# Patient Record
Sex: MALE | Race: AMERICAN INDIAN OR ALASKA NATIVE | NOT HISPANIC OR LATINO | URBAN - METROPOLITAN AREA
[De-identification: names, ages, dates, MRNs, and addresses within clinical notes are randomized per-mention and may not be internally consistent; named-entity substitution may affect disease eponyms.]

---

## 2020-12-31 ENCOUNTER — EMERGENCY (EMERGENCY)
Facility: HOSPITAL | Age: 45
LOS: 0 days | Discharge: HOME | End: 2021-01-01
Attending: EMERGENCY MEDICINE | Admitting: EMERGENCY MEDICINE
Payer: COMMERCIAL

## 2020-12-31 VITALS
DIASTOLIC BLOOD PRESSURE: 93 MMHG | TEMPERATURE: 102 F | OXYGEN SATURATION: 97 % | HEART RATE: 100 BPM | RESPIRATION RATE: 18 BRPM | SYSTOLIC BLOOD PRESSURE: 158 MMHG

## 2020-12-31 VITALS
TEMPERATURE: 100 F | SYSTOLIC BLOOD PRESSURE: 141 MMHG | OXYGEN SATURATION: 98 % | DIASTOLIC BLOOD PRESSURE: 74 MMHG | HEART RATE: 92 BPM | RESPIRATION RATE: 18 BRPM

## 2020-12-31 DIAGNOSIS — E78.5 HYPERLIPIDEMIA, UNSPECIFIED: ICD-10-CM

## 2020-12-31 DIAGNOSIS — R50.9 FEVER, UNSPECIFIED: ICD-10-CM

## 2020-12-31 DIAGNOSIS — E11.9 TYPE 2 DIABETES MELLITUS WITHOUT COMPLICATIONS: ICD-10-CM

## 2020-12-31 DIAGNOSIS — Z20.828 CONTACT WITH AND (SUSPECTED) EXPOSURE TO OTHER VIRAL COMMUNICABLE DISEASES: ICD-10-CM

## 2020-12-31 DIAGNOSIS — U07.1 COVID-19: ICD-10-CM

## 2020-12-31 DIAGNOSIS — E03.9 HYPOTHYROIDISM, UNSPECIFIED: ICD-10-CM

## 2020-12-31 DIAGNOSIS — Z91.048 OTHER NONMEDICINAL SUBSTANCE ALLERGY STATUS: ICD-10-CM

## 2020-12-31 LAB
BASOPHILS # BLD AUTO: 0 K/UL — SIGNIFICANT CHANGE UP (ref 0–0.2)
BASOPHILS NFR BLD AUTO: 0 % — SIGNIFICANT CHANGE UP (ref 0–1)
EOSINOPHIL # BLD AUTO: 0.05 K/UL — SIGNIFICANT CHANGE UP (ref 0–0.7)
EOSINOPHIL NFR BLD AUTO: 0.9 % — SIGNIFICANT CHANGE UP (ref 0–8)
HCT VFR BLD CALC: 41.8 % — LOW (ref 42–52)
HGB BLD-MCNC: 14.2 G/DL — SIGNIFICANT CHANGE UP (ref 14–18)
IMM GRANULOCYTES NFR BLD AUTO: 0.2 % — SIGNIFICANT CHANGE UP (ref 0.1–0.3)
LYMPHOCYTES # BLD AUTO: 0.86 K/UL — LOW (ref 1.2–3.4)
LYMPHOCYTES # BLD AUTO: 15.9 % — LOW (ref 20.5–51.1)
MCHC RBC-ENTMCNC: 29.6 PG — SIGNIFICANT CHANGE UP (ref 27–31)
MCHC RBC-ENTMCNC: 34 G/DL — SIGNIFICANT CHANGE UP (ref 32–37)
MCV RBC AUTO: 87.1 FL — SIGNIFICANT CHANGE UP (ref 80–94)
MONOCYTES # BLD AUTO: 0.7 K/UL — HIGH (ref 0.1–0.6)
MONOCYTES NFR BLD AUTO: 12.9 % — HIGH (ref 1.7–9.3)
NEUTROPHILS # BLD AUTO: 3.79 K/UL — SIGNIFICANT CHANGE UP (ref 1.4–6.5)
NEUTROPHILS NFR BLD AUTO: 70.1 % — SIGNIFICANT CHANGE UP (ref 42.2–75.2)
NRBC # BLD: 0 /100 WBCS — SIGNIFICANT CHANGE UP (ref 0–0)
PLATELET # BLD AUTO: 204 K/UL — SIGNIFICANT CHANGE UP (ref 130–400)
RBC # BLD: 4.8 M/UL — SIGNIFICANT CHANGE UP (ref 4.7–6.1)
RBC # FLD: 12.8 % — SIGNIFICANT CHANGE UP (ref 11.5–14.5)
WBC # BLD: 5.41 K/UL — SIGNIFICANT CHANGE UP (ref 4.8–10.8)
WBC # FLD AUTO: 5.41 K/UL — SIGNIFICANT CHANGE UP (ref 4.8–10.8)

## 2020-12-31 PROCEDURE — 99284 EMERGENCY DEPT VISIT MOD MDM: CPT | Mod: CR

## 2020-12-31 RX ORDER — ROSUVASTATIN CALCIUM 5 MG/1
1 TABLET ORAL
Qty: 0 | Refills: 0 | DISCHARGE

## 2020-12-31 RX ORDER — LEVOTHYROXINE SODIUM 125 MCG
1 TABLET ORAL
Qty: 0 | Refills: 0 | DISCHARGE

## 2020-12-31 RX ORDER — KETOROLAC TROMETHAMINE 30 MG/ML
15 SYRINGE (ML) INJECTION ONCE
Refills: 0 | Status: DISCONTINUED | OUTPATIENT
Start: 2020-12-31 | End: 2020-12-31

## 2020-12-31 RX ORDER — SODIUM CHLORIDE 9 MG/ML
2000 INJECTION INTRAMUSCULAR; INTRAVENOUS; SUBCUTANEOUS ONCE
Refills: 0 | Status: COMPLETED | OUTPATIENT
Start: 2020-12-31 | End: 2020-12-31

## 2020-12-31 RX ORDER — ACETAMINOPHEN 500 MG
975 TABLET ORAL ONCE
Refills: 0 | Status: COMPLETED | OUTPATIENT
Start: 2020-12-31 | End: 2020-12-31

## 2020-12-31 RX ORDER — METFORMIN HYDROCHLORIDE 850 MG/1
0 TABLET ORAL
Qty: 0 | Refills: 0 | DISCHARGE

## 2020-12-31 RX ADMIN — Medication 15 MILLIGRAM(S): at 23:59

## 2020-12-31 RX ADMIN — SODIUM CHLORIDE 2000 MILLILITER(S): 9 INJECTION INTRAMUSCULAR; INTRAVENOUS; SUBCUTANEOUS at 23:59

## 2020-12-31 RX ADMIN — Medication 975 MILLIGRAM(S): at 23:59

## 2021-01-01 LAB
ALBUMIN SERPL ELPH-MCNC: 4.1 G/DL — SIGNIFICANT CHANGE UP (ref 3.5–5.2)
ALP SERPL-CCNC: 67 U/L — SIGNIFICANT CHANGE UP (ref 30–115)
ALT FLD-CCNC: 29 U/L — SIGNIFICANT CHANGE UP (ref 0–41)
ANION GAP SERPL CALC-SCNC: 11 MMOL/L — SIGNIFICANT CHANGE UP (ref 7–14)
AST SERPL-CCNC: 32 U/L — SIGNIFICANT CHANGE UP (ref 0–41)
BILIRUB SERPL-MCNC: 0.4 MG/DL — SIGNIFICANT CHANGE UP (ref 0.2–1.2)
BUN SERPL-MCNC: 14 MG/DL — SIGNIFICANT CHANGE UP (ref 10–20)
CALCIUM SERPL-MCNC: 8.9 MG/DL — SIGNIFICANT CHANGE UP (ref 8.5–10.1)
CHLORIDE SERPL-SCNC: 100 MMOL/L — SIGNIFICANT CHANGE UP (ref 98–110)
CO2 SERPL-SCNC: 23 MMOL/L — SIGNIFICANT CHANGE UP (ref 17–32)
CREAT SERPL-MCNC: 1 MG/DL — SIGNIFICANT CHANGE UP (ref 0.7–1.5)
GLUCOSE SERPL-MCNC: 138 MG/DL — HIGH (ref 70–99)
MAGNESIUM SERPL-MCNC: 2.1 MG/DL — SIGNIFICANT CHANGE UP (ref 1.8–2.4)
POTASSIUM SERPL-MCNC: 4.2 MMOL/L — SIGNIFICANT CHANGE UP (ref 3.5–5)
POTASSIUM SERPL-SCNC: 4.2 MMOL/L — SIGNIFICANT CHANGE UP (ref 3.5–5)
PROT SERPL-MCNC: 7.2 G/DL — SIGNIFICANT CHANGE UP (ref 6–8)
SODIUM SERPL-SCNC: 134 MMOL/L — LOW (ref 135–146)
TROPONIN T SERPL-MCNC: <0.01 NG/ML — SIGNIFICANT CHANGE UP

## 2021-01-01 PROCEDURE — 71045 X-RAY EXAM CHEST 1 VIEW: CPT | Mod: 26

## 2021-01-01 NOTE — ED PROVIDER NOTE - ATTENDING CONTRIBUTION TO CARE
45yM DM on metformin HTN DLD p/w flu-like sx - chills, myalgias, generalized weakness and body aches x days.  Pt w/ known covid+ contact.  Pt w/ mild SOB but no sig CP.  Pt tolerating PO.    VS w/o hypoxia  exam w/ nontoxic appearing male w/o any resp distress/inc WOB

## 2021-01-01 NOTE — ED PROVIDER NOTE - PHYSICAL EXAMINATION
PHYSICAL EXAM:    GENERAL: Alert, appears stated age, well appearing, non-toxic  SKIN: Warm, pink and dry.   HEAD: NC  EYE: Normal lids/conjunctiva  ENT: Normal hearing, patent oropharynx without erythema or exudate  NECK: +supple. No meningismus, or JVD  Pulm: Bilateral BS, normal resp effort, no wheezes, stridor, or retractions. speaking in compelte sentences @ 98 sat on ra.   CV: RRR, no M/R/G, 2+and = radial pulses  Abd: soft, non-tender, non-distended. no rebound/guarding.    Mskel: no erythema, cyanosis, edema. no calf tenderness  Neuro: AAOx3, . normal gait.

## 2021-01-01 NOTE — ED PROVIDER NOTE - CLINICAL SUMMARY MEDICAL DECISION MAKING FREE TEXT BOX
45yM DM HTN DLD p/w flu-like sx in the setting of covid+ contact.  Pt well appearing, comfortable, w/o hypoxia or resp distress.  Labs reassuring.  EKG w/o ischemia or arrhythmia.  CXR w/ b/l opacities c/w covid infection.  COVID PCR pending.  Recommend supportive care, o/p f/u, return precautions.

## 2021-01-01 NOTE — ED PROVIDER NOTE - OBJECTIVE STATEMENT
44 y/o M with PMH DM on metformin, HLD, hypothyroidism presents with global weakness, sweats/chills/body aches, mild constant aching diffuse body pain x 2 days in the setting of being around his girlfriend x 2 wks and discovering she is covid+ 3 days ago.   no cp.   Denies hemoptysis, recent surgery/immobilization, hx cancers, hx PE/DVT,  hormone use, leg pain or swelling, recent travel.

## 2021-01-01 NOTE — ED PROVIDER NOTE - NS ED ROS FT
Review of Systems    Constitutional: (+) fever   Eyes/ENT: (-) vision changes  Cardiovascular: (-) chest pain, (-) syncope (-) palpitations  Respiratory: (-) cough, (+) shortness of breath  Gastrointestinal: (-) vomiting, (-) diarrhea (-) abdominal pain  Genitourinary:  (-) dysuria   Musculoskeletal: (-) neck pain, (-) back pain, (-) leg pain/swelling  Integumentary: (-) rash, (-) edema  Neurological: (-) headache, (-) confusion  Hematologic: (-) easy bruising   Psychiatric: (-) hallucinations  Allergic/Immunologic: (-) pruritus

## 2021-01-02 LAB — SARS-COV-2 RNA SPEC QL NAA+PROBE: DETECTED

## 2021-01-07 ENCOUNTER — INPATIENT (INPATIENT)
Facility: HOSPITAL | Age: 46
LOS: 1 days | Discharge: HOME | End: 2021-01-09
Attending: INTERNAL MEDICINE | Admitting: INTERNAL MEDICINE
Payer: COMMERCIAL

## 2021-01-07 VITALS
HEART RATE: 96 BPM | TEMPERATURE: 99 F | DIASTOLIC BLOOD PRESSURE: 67 MMHG | HEIGHT: 66 IN | SYSTOLIC BLOOD PRESSURE: 121 MMHG | OXYGEN SATURATION: 95 % | RESPIRATION RATE: 20 BRPM | WEIGHT: 175.05 LBS

## 2021-01-07 LAB
ALBUMIN SERPL ELPH-MCNC: 3.7 G/DL — SIGNIFICANT CHANGE UP (ref 3.5–5.2)
ALP SERPL-CCNC: 49 U/L — SIGNIFICANT CHANGE UP (ref 30–115)
ALT FLD-CCNC: 28 U/L — SIGNIFICANT CHANGE UP (ref 0–41)
ANION GAP SERPL CALC-SCNC: 11 MMOL/L — SIGNIFICANT CHANGE UP (ref 7–14)
AST SERPL-CCNC: 33 U/L — SIGNIFICANT CHANGE UP (ref 0–41)
BASOPHILS # BLD AUTO: 0.01 K/UL — SIGNIFICANT CHANGE UP (ref 0–0.2)
BASOPHILS NFR BLD AUTO: 0.2 % — SIGNIFICANT CHANGE UP (ref 0–1)
BILIRUB SERPL-MCNC: 0.5 MG/DL — SIGNIFICANT CHANGE UP (ref 0.2–1.2)
BUN SERPL-MCNC: 12 MG/DL — SIGNIFICANT CHANGE UP (ref 10–20)
CALCIUM SERPL-MCNC: 8.7 MG/DL — SIGNIFICANT CHANGE UP (ref 8.5–10.1)
CHLORIDE SERPL-SCNC: 96 MMOL/L — LOW (ref 98–110)
CO2 SERPL-SCNC: 24 MMOL/L — SIGNIFICANT CHANGE UP (ref 17–32)
CREAT SERPL-MCNC: 0.9 MG/DL — SIGNIFICANT CHANGE UP (ref 0.7–1.5)
CRP SERPL-MCNC: 7.48 MG/DL — HIGH (ref 0–0.4)
D DIMER BLD IA.RAPID-MCNC: 122 NG/ML DDU — SIGNIFICANT CHANGE UP (ref 0–230)
EOSINOPHIL # BLD AUTO: 0 K/UL — SIGNIFICANT CHANGE UP (ref 0–0.7)
EOSINOPHIL NFR BLD AUTO: 0 % — SIGNIFICANT CHANGE UP (ref 0–8)
FERRITIN SERPL-MCNC: 641 NG/ML — HIGH (ref 30–400)
GLUCOSE SERPL-MCNC: 118 MG/DL — HIGH (ref 70–99)
HCT VFR BLD CALC: 36.8 % — LOW (ref 42–52)
HGB BLD-MCNC: 12.6 G/DL — LOW (ref 14–18)
IMM GRANULOCYTES NFR BLD AUTO: 0.4 % — HIGH (ref 0.1–0.3)
LYMPHOCYTES # BLD AUTO: 0.7 K/UL — LOW (ref 1.2–3.4)
LYMPHOCYTES # BLD AUTO: 15 % — LOW (ref 20.5–51.1)
MCHC RBC-ENTMCNC: 29.4 PG — SIGNIFICANT CHANGE UP (ref 27–31)
MCHC RBC-ENTMCNC: 34.2 G/DL — SIGNIFICANT CHANGE UP (ref 32–37)
MCV RBC AUTO: 85.8 FL — SIGNIFICANT CHANGE UP (ref 80–94)
MONOCYTES # BLD AUTO: 0.48 K/UL — SIGNIFICANT CHANGE UP (ref 0.1–0.6)
MONOCYTES NFR BLD AUTO: 10.3 % — HIGH (ref 1.7–9.3)
NEUTROPHILS # BLD AUTO: 3.47 K/UL — SIGNIFICANT CHANGE UP (ref 1.4–6.5)
NEUTROPHILS NFR BLD AUTO: 74.1 % — SIGNIFICANT CHANGE UP (ref 42.2–75.2)
NRBC # BLD: 0 /100 WBCS — SIGNIFICANT CHANGE UP (ref 0–0)
PLATELET # BLD AUTO: 212 K/UL — SIGNIFICANT CHANGE UP (ref 130–400)
POTASSIUM SERPL-MCNC: 4.2 MMOL/L — SIGNIFICANT CHANGE UP (ref 3.5–5)
POTASSIUM SERPL-SCNC: 4.2 MMOL/L — SIGNIFICANT CHANGE UP (ref 3.5–5)
PROCALCITONIN SERPL-MCNC: 0.08 NG/ML — SIGNIFICANT CHANGE UP (ref 0.02–0.1)
PROT SERPL-MCNC: 6.8 G/DL — SIGNIFICANT CHANGE UP (ref 6–8)
RBC # BLD: 4.29 M/UL — LOW (ref 4.7–6.1)
RBC # FLD: 12.6 % — SIGNIFICANT CHANGE UP (ref 11.5–14.5)
SODIUM SERPL-SCNC: 131 MMOL/L — LOW (ref 135–146)
WBC # BLD: 4.68 K/UL — LOW (ref 4.8–10.8)
WBC # FLD AUTO: 4.68 K/UL — LOW (ref 4.8–10.8)

## 2021-01-07 PROCEDURE — 99223 1ST HOSP IP/OBS HIGH 75: CPT | Mod: AI

## 2021-01-07 PROCEDURE — 71045 X-RAY EXAM CHEST 1 VIEW: CPT | Mod: 26

## 2021-01-07 PROCEDURE — 99285 EMERGENCY DEPT VISIT HI MDM: CPT

## 2021-01-07 RX ORDER — PANTOPRAZOLE SODIUM 20 MG/1
40 TABLET, DELAYED RELEASE ORAL
Refills: 0 | Status: DISCONTINUED | OUTPATIENT
Start: 2021-01-07 | End: 2021-01-09

## 2021-01-07 RX ORDER — ACETAMINOPHEN 500 MG
650 TABLET ORAL EVERY 6 HOURS
Refills: 0 | Status: DISCONTINUED | OUTPATIENT
Start: 2021-01-07 | End: 2021-01-09

## 2021-01-07 RX ORDER — INFLUENZA VIRUS VACCINE 15; 15; 15; 15 UG/.5ML; UG/.5ML; UG/.5ML; UG/.5ML
0.5 SUSPENSION INTRAMUSCULAR ONCE
Refills: 0 | Status: DISCONTINUED | OUTPATIENT
Start: 2021-01-07 | End: 2021-01-09

## 2021-01-07 RX ORDER — ATORVASTATIN CALCIUM 80 MG/1
20 TABLET, FILM COATED ORAL AT BEDTIME
Refills: 0 | Status: DISCONTINUED | OUTPATIENT
Start: 2021-01-07 | End: 2021-01-09

## 2021-01-07 RX ORDER — METFORMIN HYDROCHLORIDE 850 MG/1
500 TABLET ORAL
Refills: 0 | Status: DISCONTINUED | OUTPATIENT
Start: 2021-01-07 | End: 2021-01-08

## 2021-01-07 RX ORDER — DEXAMETHASONE 0.5 MG/5ML
6 ELIXIR ORAL ONCE
Refills: 0 | Status: COMPLETED | OUTPATIENT
Start: 2021-01-07 | End: 2021-01-07

## 2021-01-07 RX ORDER — ENOXAPARIN SODIUM 100 MG/ML
40 INJECTION SUBCUTANEOUS DAILY
Refills: 0 | Status: DISCONTINUED | OUTPATIENT
Start: 2021-01-07 | End: 2021-01-09

## 2021-01-07 RX ORDER — DEXAMETHASONE 0.5 MG/5ML
6 ELIXIR ORAL DAILY
Refills: 0 | Status: DISCONTINUED | OUTPATIENT
Start: 2021-01-08 | End: 2021-01-09

## 2021-01-07 RX ORDER — CHLORHEXIDINE GLUCONATE 213 G/1000ML
1 SOLUTION TOPICAL
Refills: 0 | Status: DISCONTINUED | OUTPATIENT
Start: 2021-01-07 | End: 2021-01-09

## 2021-01-07 RX ORDER — SODIUM CHLORIDE 9 MG/ML
1000 INJECTION INTRAMUSCULAR; INTRAVENOUS; SUBCUTANEOUS ONCE
Refills: 0 | Status: COMPLETED | OUTPATIENT
Start: 2021-01-07 | End: 2021-01-07

## 2021-01-07 RX ORDER — LEVOTHYROXINE SODIUM 125 MCG
50 TABLET ORAL DAILY
Refills: 0 | Status: DISCONTINUED | OUTPATIENT
Start: 2021-01-07 | End: 2021-01-09

## 2021-01-07 RX ORDER — SODIUM CHLORIDE 9 MG/ML
1000 INJECTION INTRAMUSCULAR; INTRAVENOUS; SUBCUTANEOUS
Refills: 0 | Status: DISCONTINUED | OUTPATIENT
Start: 2021-01-07 | End: 2021-01-08

## 2021-01-07 RX ADMIN — ATORVASTATIN CALCIUM 20 MILLIGRAM(S): 80 TABLET, FILM COATED ORAL at 21:52

## 2021-01-07 RX ADMIN — CHLORHEXIDINE GLUCONATE 1 APPLICATION(S): 213 SOLUTION TOPICAL at 15:10

## 2021-01-07 RX ADMIN — ENOXAPARIN SODIUM 40 MILLIGRAM(S): 100 INJECTION SUBCUTANEOUS at 15:11

## 2021-01-07 RX ADMIN — METFORMIN HYDROCHLORIDE 500 MILLIGRAM(S): 850 TABLET ORAL at 18:31

## 2021-01-07 RX ADMIN — SODIUM CHLORIDE 75 MILLILITER(S): 9 INJECTION INTRAMUSCULAR; INTRAVENOUS; SUBCUTANEOUS at 17:01

## 2021-01-07 RX ADMIN — Medication 6 MILLIGRAM(S): at 06:55

## 2021-01-07 RX ADMIN — Medication 50 MICROGRAM(S): at 10:01

## 2021-01-07 RX ADMIN — SODIUM CHLORIDE 75 MILLILITER(S): 9 INJECTION INTRAMUSCULAR; INTRAVENOUS; SUBCUTANEOUS at 09:35

## 2021-01-07 RX ADMIN — SODIUM CHLORIDE 333.33 MILLILITER(S): 9 INJECTION INTRAMUSCULAR; INTRAVENOUS; SUBCUTANEOUS at 07:04

## 2021-01-07 RX ADMIN — PANTOPRAZOLE SODIUM 40 MILLIGRAM(S): 20 TABLET, DELAYED RELEASE ORAL at 10:01

## 2021-01-07 NOTE — ED PROVIDER NOTE - ATTENDING CONTRIBUTION TO CARE
46 yo Cantonese speaking M, hx of DM II on metformin, HLD, hypothyroidism, diagnosed with Covid 1/1 with sx onset 12/30, here for worsening dyspnea.    Patient with RA resting sat of 95 but rapid desat to high 80s with minimal exertion, mildly tachypenic, coarse BS bilaterally.    Patient has DM II as risk factor, is nearing peak of sx with exertional desat requiring supplemental O2. Also has worsening of CXR when compared to 1/1.    Will check labs, give dex, admit north for further monitoring, supplemental O2.

## 2021-01-07 NOTE — H&P ADULT - NSHPLABSRESULTS_GEN_ALL_CORE
01-07    131<L>  |  96<L>  |  12  ----------------------------<  118<H>  4.2   |  24  |  0.9    Ca    8.7      07 Jan 2021 07:00    TPro  6.8  /  Alb  3.7  /  TBili  0.5  /  DBili  x   /  AST  33  /  ALT  28  /  AlkPhos  49  01-07                            12.6   4.68  )-----------( 212      ( 07 Jan 2021 07:00 )             36.8          cxr: worsening opacities compared to previous cxr

## 2021-01-07 NOTE — ED PROVIDER NOTE - OBJECTIVE STATEMENT
44 yo male hx of HLD/DM/hypothyroid present c/o SOB/generalized weakness over the past week. Reported he was checking his Pulse Ox at home and it was down to 90-91% at home so his brother called EMS. Patient was tested positive for COVID few days ago. Also reported subjective fever/chill. denies HA/chest pain/abd pain/n/v/d and urinary sxs.

## 2021-01-07 NOTE — ED PROVIDER NOTE - CLINICAL SUMMARY MEDICAL DECISION MAKING FREE TEXT BOX
patient admitted for further management at this time secondary to covid infection with hypoxia room air o2 into the upper 80's I will admit for further management

## 2021-01-07 NOTE — ED PROVIDER NOTE - PHYSICAL EXAMINATION
CONSTITUTIONAL: Well-appearing; well-nourished; in no apparent distress.   EYES: PERRL; EOM intact.   CARDIOVASCULAR: Normal S1, S2; no murmurs, rubs, or gallops.   RESPIRATORY: RR - 20.    GI/: Normal bowel sounds; non-distended; non-tender; no palpable organomegaly.   MS: No calf swelling and tenderness.  SKIN: Normal for age and race; warm; dry; good turgor; no apparent lesions or exudate.   NEURO/PSYCH: A & O x 4; grossly unremarkable. CONSTITUTIONAL: Well-appearing; well-nourished; in no apparent distress.   EYES: PERRL; EOM intact.   CARDIOVASCULAR: Normal S1, S2; no murmurs, rubs, or gallops.   RESPIRATORY: RR - 20. b/l diffuse rales. mild accessory muscles use.   GI/: Normal bowel sounds; non-distended; non-tender; no palpable organomegaly.   MS: No calf swelling and tenderness.  SKIN: Normal for age and race; warm; dry; good turgor; no apparent lesions or exudate.   NEURO/PSYCH: A & O x 4; grossly unremarkable.

## 2021-01-07 NOTE — H&P ADULT - HISTORY OF PRESENT ILLNESS
44 yo male hx of HLD/DM/hypothyroid present c/o SOB/generalized weakness over the past week. Reported he was checking his Pulse Ox at home and it was down to 90-91% at home so his brother called EMS. Patient was tested positive for COVID few days ago. Also reported subjective fever/chill. Denies HA/chest pain/abd pain/n/v/d and urinary sxs. 46 yo male hx of HLD/DM/hypothyroid present c/o SOB/generalized weakness over the past week. Reported he was checking his Pulse Ox at home and it was down to 90-91% at home so his brother called EMS. Patient was tested positive for COVID few days ago. Also reported subjective fever/chill. Denies HA/chest pain/abd pain/n/v/d and urinary sxs.    feels better with O2 on but feel worse when he has to ambulate

## 2021-01-07 NOTE — H&P ADULT - ASSESSMENT
46 yo Cantonese speaking M, hx of DM II on metformin, HLD, hypothyroidism, diagnosed with Covid 1/1 with sx onset 12/30, here for worsening dyspnea.    Patient with RA resting sat of 95 but rapid desat to high 80s with minimal exertion, mildly tachypneic, coarse BS bilaterally.    Patient has DM II as risk factor, is nearing peak of sx with exertional desat requiring supplemental O2. Also has worsening of CXR when compared to 1/1.    # COVID PNA   - F/U INFLAM. drawn today   - d-dimer 122  - leukopenic : labs in am   - cxr in am   - started DEC in er , will c/w 6mg daily   - ID consult   - isolation   - oxygen supplementation for sat over 92%    # Hyponatremia 131  - ivf ns at 75 cc / hr   - labs in am    44 yo Cantonese speaking M, hx of DM II on metformin, HLD, hypothyroidism, diagnosed with Covid 1/1 with sx onset 12/30, here for worsening dyspnea.    Patient with RA resting sat of 95 but rapid desat to high 80s with minimal exertion, mildly tachypneic, coarse BS bilaterally.    Patient has DM II as risk factor, is nearing peak of sx with exertional desat requiring supplemental O2. Also has worsening of CXR when compared to 1/1.    # COVID PNA   - F/U INFLAM. drawn today   - d-dimer 122  - leukopenic : labs in am   - cxr in am   - started DEC in er , will c/w 6mg daily   - ID consult   - isolation   - oxygen supplementation for sat over 92%  - lovenox 40mg daily     # Hyponatremia 131  - ivf ns at 75 cc / hr   - labs in am     # DM  - c/w metformin   - fsbsl     # HLD   - c/w statin     # Hypothyroidism   - c/w synthroid     d/w dr. Pyle   46 yo Cantonese speaking M, hx of DM II on metformin, HLD, hypothyroidism, diagnosed with Covid 1/1 with sx onset 12/30, here for worsening dyspnea.    Patient with RA resting sat of 95 but rapid desat to high 80s with minimal exertion, mildly tachypneic, coarse BS bilaterally.    Patient has DM II as risk factor, is nearing peak of sx with exertional desat requiring supplemental O2. Also has worsening of CXR when compared to 1/1.    # COVID PNA   - F/U INFLAM. drawn today   - d-dimer 122  - leukopenic : labs in am   - cxr in am   - Xtarted DEC in er , will c/w 6mg daily   - ID consult   - isolation   - oxygen supplementation for sat over 92%  - lovenox 40mg daily     # Hyponatremia 131  - ivf ns at 75 cc / hr   - labs in am     # DM  - c/w metformin   - fsbsl     # HLD   - c/w statin     # Hypothyroidism   - c/w synthroid     d/w dr. Pyle

## 2021-01-07 NOTE — H&P ADULT - ATTENDING COMMENTS
I have performed a history and physical exam of this patient and discussed the management with advanced clinical provider. I have reviewed the note and agree with the documented findings and plan of care.

## 2021-01-07 NOTE — ED PROVIDER NOTE - PROGRESS NOTE DETAILS
Ambulatory Saturation down to 90% on RA. Increasing LLL infiltrate. will admit AG: received signout from DEMOND Araujo. Pt seen at bedside resting comfortably texting on phone, no new complaints. Pending labs, will admit north for worsening covid + hypoxia on ambulation.

## 2021-01-07 NOTE — ED PROVIDER NOTE - NS ED ROS FT
Constitutional: + Malaise. no fever, chills, no recent weight loss, change in appetite  Eyes: no redness/discharge/pain/vision changes  ENT: no rhinorrhea/ear pain/sore throat  Cardiac: No chest pain or edema.  Respiratory: see HPI  GI: No nausea, vomiting, diarrhea or abdominal pain.  : No dysuria, frequency, urgency or hematuria  MS: no pain to back or extremities, no loss of ROM, no weakness  Neuro: No headache or weakness. No LOC.  Skin: No skin rash.  Endocrine: + diabetes.

## 2021-01-07 NOTE — H&P ADULT - NSHPPHYSICALEXAM_GEN_ALL_CORE
Vital Signs Last 24 Hrs  T(C): 37.8 (07 Jan 2021 07:37), Max: 37.8 (07 Jan 2021 07:37)  T(F): 100.1 (07 Jan 2021 07:37), Max: 100.1 (07 Jan 2021 07:37)  HR: 90 (07 Jan 2021 07:37) (90 - 96)  BP: 128/64 (07 Jan 2021 07:37) (121/67 - 128/64)  RR: 20 (07 Jan 2021 07:37) (20 - 20)  SpO2: 98% (07 Jan 2021 07:37) (95% - 98%)    CONSTITUTIONAL: Well-appearing; well-nourished; in no apparent distress.   EYES: PERRL; EOM intact.   CARDIOVASCULAR: Normal S1, S2; no murmurs, rubs, or gallops.   RESPIRATORY: RR -  18- 20. b/l diffuse rales. mild accessory muscles use.   GI/: Normal bowel sounds; non-distended; non-tender; no palpable organomegaly.   MS: No calf swelling and tenderness.  SKIN: Normal for age and race; warm; dry; good turgor; no apparent lesions or exudate.   NEURO/PSYCH: A & O x 4; grossly unremarkable.

## 2021-01-08 ENCOUNTER — TRANSCRIPTION ENCOUNTER (OUTPATIENT)
Age: 46
End: 2021-01-08

## 2021-01-08 LAB
A1C WITH ESTIMATED AVERAGE GLUCOSE RESULT: 6.6 % — HIGH (ref 4–5.6)
ANION GAP SERPL CALC-SCNC: 11 MMOL/L — SIGNIFICANT CHANGE UP (ref 7–14)
BASOPHILS # BLD AUTO: 0 K/UL — SIGNIFICANT CHANGE UP (ref 0–0.2)
BASOPHILS NFR BLD AUTO: 0 % — SIGNIFICANT CHANGE UP (ref 0–1)
BUN SERPL-MCNC: 12 MG/DL — SIGNIFICANT CHANGE UP (ref 10–20)
CALCIUM SERPL-MCNC: 8.4 MG/DL — LOW (ref 8.5–10.1)
CHLORIDE SERPL-SCNC: 103 MMOL/L — SIGNIFICANT CHANGE UP (ref 98–110)
CO2 SERPL-SCNC: 23 MMOL/L — SIGNIFICANT CHANGE UP (ref 17–32)
CREAT SERPL-MCNC: 0.8 MG/DL — SIGNIFICANT CHANGE UP (ref 0.7–1.5)
EOSINOPHIL # BLD AUTO: 0 K/UL — SIGNIFICANT CHANGE UP (ref 0–0.7)
EOSINOPHIL NFR BLD AUTO: 0 % — SIGNIFICANT CHANGE UP (ref 0–8)
ESTIMATED AVERAGE GLUCOSE: 143 MG/DL — HIGH (ref 68–114)
GLUCOSE BLDC GLUCOMTR-MCNC: 121 MG/DL — HIGH (ref 70–99)
GLUCOSE BLDC GLUCOMTR-MCNC: 131 MG/DL — HIGH (ref 70–99)
GLUCOSE BLDC GLUCOMTR-MCNC: 131 MG/DL — HIGH (ref 70–99)
GLUCOSE BLDC GLUCOMTR-MCNC: 133 MG/DL — HIGH (ref 70–99)
GLUCOSE BLDC GLUCOMTR-MCNC: 139 MG/DL — HIGH (ref 70–99)
GLUCOSE SERPL-MCNC: 143 MG/DL — HIGH (ref 70–99)
HCT VFR BLD CALC: 37.2 % — LOW (ref 42–52)
HGB BLD-MCNC: 12.7 G/DL — LOW (ref 14–18)
IMM GRANULOCYTES NFR BLD AUTO: 0.3 % — SIGNIFICANT CHANGE UP (ref 0.1–0.3)
LYMPHOCYTES # BLD AUTO: 0.53 K/UL — LOW (ref 1.2–3.4)
LYMPHOCYTES # BLD AUTO: 5.9 % — LOW (ref 20.5–51.1)
MAGNESIUM SERPL-MCNC: 2.3 MG/DL — SIGNIFICANT CHANGE UP (ref 1.8–2.4)
MCHC RBC-ENTMCNC: 29.5 PG — SIGNIFICANT CHANGE UP (ref 27–31)
MCHC RBC-ENTMCNC: 34.1 G/DL — SIGNIFICANT CHANGE UP (ref 32–37)
MCV RBC AUTO: 86.3 FL — SIGNIFICANT CHANGE UP (ref 80–94)
MONOCYTES # BLD AUTO: 0.57 K/UL — SIGNIFICANT CHANGE UP (ref 0.1–0.6)
MONOCYTES NFR BLD AUTO: 6.4 % — SIGNIFICANT CHANGE UP (ref 1.7–9.3)
NEUTROPHILS # BLD AUTO: 7.79 K/UL — HIGH (ref 1.4–6.5)
NEUTROPHILS NFR BLD AUTO: 87.4 % — HIGH (ref 42.2–75.2)
NRBC # BLD: 0 /100 WBCS — SIGNIFICANT CHANGE UP (ref 0–0)
PLATELET # BLD AUTO: 277 K/UL — SIGNIFICANT CHANGE UP (ref 130–400)
POTASSIUM SERPL-MCNC: 4.3 MMOL/L — SIGNIFICANT CHANGE UP (ref 3.5–5)
POTASSIUM SERPL-SCNC: 4.3 MMOL/L — SIGNIFICANT CHANGE UP (ref 3.5–5)
RBC # BLD: 4.31 M/UL — LOW (ref 4.7–6.1)
RBC # FLD: 12.6 % — SIGNIFICANT CHANGE UP (ref 11.5–14.5)
SODIUM SERPL-SCNC: 137 MMOL/L — SIGNIFICANT CHANGE UP (ref 135–146)
WBC # BLD: 8.92 K/UL — SIGNIFICANT CHANGE UP (ref 4.8–10.8)
WBC # FLD AUTO: 8.92 K/UL — SIGNIFICANT CHANGE UP (ref 4.8–10.8)

## 2021-01-08 PROCEDURE — 99233 SBSQ HOSP IP/OBS HIGH 50: CPT

## 2021-01-08 RX ORDER — DEXTROSE 50 % IN WATER 50 %
12.5 SYRINGE (ML) INTRAVENOUS ONCE
Refills: 0 | Status: DISCONTINUED | OUTPATIENT
Start: 2021-01-08 | End: 2021-01-09

## 2021-01-08 RX ORDER — DEXTROSE 50 % IN WATER 50 %
25 SYRINGE (ML) INTRAVENOUS ONCE
Refills: 0 | Status: DISCONTINUED | OUTPATIENT
Start: 2021-01-08 | End: 2021-01-09

## 2021-01-08 RX ORDER — INSULIN GLARGINE 100 [IU]/ML
12 INJECTION, SOLUTION SUBCUTANEOUS AT BEDTIME
Refills: 0 | Status: DISCONTINUED | OUTPATIENT
Start: 2021-01-08 | End: 2021-01-09

## 2021-01-08 RX ORDER — INSULIN LISPRO 100/ML
2 VIAL (ML) SUBCUTANEOUS
Refills: 0 | Status: DISCONTINUED | OUTPATIENT
Start: 2021-01-08 | End: 2021-01-08

## 2021-01-08 RX ORDER — SODIUM CHLORIDE 9 MG/ML
1000 INJECTION, SOLUTION INTRAVENOUS
Refills: 0 | Status: DISCONTINUED | OUTPATIENT
Start: 2021-01-08 | End: 2021-01-09

## 2021-01-08 RX ORDER — GLUCAGON INJECTION, SOLUTION 0.5 MG/.1ML
1 INJECTION, SOLUTION SUBCUTANEOUS ONCE
Refills: 0 | Status: DISCONTINUED | OUTPATIENT
Start: 2021-01-08 | End: 2021-01-09

## 2021-01-08 RX ORDER — INSULIN LISPRO 100/ML
VIAL (ML) SUBCUTANEOUS
Refills: 0 | Status: DISCONTINUED | OUTPATIENT
Start: 2021-01-08 | End: 2021-01-09

## 2021-01-08 RX ORDER — DEXTROSE 50 % IN WATER 50 %
15 SYRINGE (ML) INTRAVENOUS ONCE
Refills: 0 | Status: DISCONTINUED | OUTPATIENT
Start: 2021-01-08 | End: 2021-01-09

## 2021-01-08 RX ADMIN — Medication 50 MICROGRAM(S): at 05:04

## 2021-01-08 RX ADMIN — ENOXAPARIN SODIUM 40 MILLIGRAM(S): 100 INJECTION SUBCUTANEOUS at 11:31

## 2021-01-08 RX ADMIN — METFORMIN HYDROCHLORIDE 500 MILLIGRAM(S): 850 TABLET ORAL at 05:04

## 2021-01-08 RX ADMIN — Medication 6 MILLIGRAM(S): at 05:04

## 2021-01-08 RX ADMIN — ATORVASTATIN CALCIUM 20 MILLIGRAM(S): 80 TABLET, FILM COATED ORAL at 21:36

## 2021-01-08 RX ADMIN — PANTOPRAZOLE SODIUM 40 MILLIGRAM(S): 20 TABLET, DELAYED RELEASE ORAL at 05:04

## 2021-01-08 RX ADMIN — CHLORHEXIDINE GLUCONATE 1 APPLICATION(S): 213 SOLUTION TOPICAL at 05:04

## 2021-01-08 RX ADMIN — INSULIN GLARGINE 12 UNIT(S): 100 INJECTION, SOLUTION SUBCUTANEOUS at 21:36

## 2021-01-08 NOTE — PROGRESS NOTE ADULT - SUBJECTIVE AND OBJECTIVE BOX
SUREKHA FOSTER  45y  Male      Patient is a 45y old  Male who presents with a chief complaint of SOB      INTERVAL HPI/OVERNIGHT EVENTS: The patient was seen at bedside.   Feeling better.       ******************************* REVIEW OF SYSTEMS:**********************************************    All other review of systems negative    *********************** VITALS ******************************************    T(F): 98 (01-08-21 @ 05:00)  HR: 71 (01-08-21 @ 05:00) (71 - 74)  BP: 130/75 (01-08-21 @ 05:00) (115/56 - 130/75)  RR: 18 (01-08-21 @ 05:00) (18 - 18)  SpO2: 92% (01-08-21 @ 11:31) (92% - 100%)    01-07-21 @ 07:01  -  01-08-21 @ 07:00  --------------------------------------------------------  IN: 1000 mL / OUT: 0 mL / NET: 1000 mL    01-08-21 @ 07:01  -  01-08-21 @ 13:50  --------------------------------------------------------  IN: 150 mL / OUT: 0 mL / NET: 150 mL            01-07-21 @ 07:01  -  01-08-21 @ 07:00  --------------------------------------------------------  IN: 1000 mL / OUT: 0 mL / NET: 1000 mL    01-08-21 @ 07:01  -  01-08-21 @ 13:50  --------------------------------------------------------  IN: 150 mL / OUT: 0 mL / NET: 150 mL        ******************************** PHYSICAL EXAM:**************************************************  GENERAL: NAD    PSYCH: no agitation, baseline mentation  HEENT:     NERVOUS SYSTEM:  Alert & Oriented X3, MS  5/5 B/L  UE and LE ; Sensory intact    PULMONARY: ERIC, CTA    CARDIOVASCULAR: S1S2 RRR    GI: Soft, NT, ND; BS present.    EXTREMITIES:  2+ Peripheral Pulses, No clubbing, cyanosis, or edema    LYMPH: No lymphadenopathy noted    SKIN: No rashes or lesions      **************************** LABS *******************************************************                          12.7   8.92  )-----------( 277      ( 08 Jan 2021 11:50 )             37.2     01-08    137  |  103  |  12  ----------------------------<  143<H>  4.3   |  23  |  0.8    Ca    8.4<L>      08 Jan 2021 11:50  Mg     2.3     01-08    TPro  6.8  /  Alb  3.7  /  TBili  0.5  /  DBili  x   /  AST  33  /  ALT  28  /  AlkPhos  49  01-07          Lactate Trend        CAPILLARY BLOOD GLUCOSE  131 (07 Jan 2021 10:11)      POCT Blood Glucose.: 131 mg/dL (08 Jan 2021 12:00)          **************************Active Medications *******************************************  No Known Drug Allergies  seasonal (Other)      acetaminophen   Tablet .. 650 milliGRAM(s) Oral every 6 hours PRN  atorvastatin 20 milliGRAM(s) Oral at bedtime  chlorhexidine 4% Liquid 1 Application(s) Topical <User Schedule>  dexAMETHasone  Injectable 6 milliGRAM(s) IV Push daily  dextrose 40% Gel 15 Gram(s) Oral once  dextrose 5%. 1000 milliLiter(s) IV Continuous <Continuous>  dextrose 5%. 1000 milliLiter(s) IV Continuous <Continuous>  dextrose 50% Injectable 25 Gram(s) IV Push once  dextrose 50% Injectable 12.5 Gram(s) IV Push once  dextrose 50% Injectable 25 Gram(s) IV Push once  enoxaparin Injectable 40 milliGRAM(s) SubCutaneous daily  glucagon  Injectable 1 milliGRAM(s) IntraMuscular once  influenza   Vaccine 0.5 milliLiter(s) IntraMuscular once  insulin glargine Injectable (LANTUS) 12 Unit(s) SubCutaneous at bedtime  insulin lispro (ADMELOG) corrective regimen sliding scale   SubCutaneous three times a day before meals  levothyroxine 50 MICROGram(s) Oral daily  pantoprazole    Tablet 40 milliGRAM(s) Oral before breakfast  sodium chloride 0.9%. 1000 milliLiter(s) IV Continuous <Continuous>      ***************************************************  RADIOLOGY & ADDITIONAL TESTS:    Imaging Personally Reviewed:  [ ] YES  [ ] NO    HEALTH ISSUES - PROBLEM Dx:

## 2021-01-08 NOTE — PROGRESS NOTE ADULT - ASSESSMENT
46 yo Cantonese speaking M, hx of DM II on metformin, HLD, hypothyroidism, diagnosed with Covid 1/1 with sx onset 12/30, here for worsening dyspnea.    Patient with RA resting sat of 95 but rapid desat to high 80s with minimal exertion, mildly tachypneic, coarse BS bilaterally.    Patient has DM II as risk factor, is nearing peak of sx with exertional desat requiring supplemental O2. Also has worsening of CXR when compared to 1/1.    # COVID PNA   - F/U INFLAM. drawn today   - d-dimer 122  - leukopenic : labs in am   - cxr in am   - Xtarted DEC in er , will c/w 6mg daily   - ID consult   - isolation   - oxygen supplementation for sat over 92%  - lovenox 40mg daily     # Hyponatremia 131  - ivf ns at 75 cc / hr   - labs in am     # DM  - c/w metformin   - fsbsl     # HLD   - c/w statin     # Hypothyroidism   - c/w synthroid      46 yo M PMHx DM II on metformin, HLD, hypothyroidism presented with SOB (checked spO2 at home 90%) and generalized weakness of 1 week duration, pt had tested COVID positive on 1/1 outpatient (symptoms started 12/30).  In ED, spO2 95 on RA but desated to high 80s with minimal exertion    # Acute hypoxic respiratory failure 2/2 COVID PNA  - 1/8 spO2 98% on 3L NC, will downtitrate. Tmax 100.1  - CXR 1/7: bilateral opacities  - D-dimer 122, ferritin 641, CRP 7.48, procal 0.08  - c/w decadron 6 mg IV daily for 10 days (start date 1/7)  - c/w lovenox 40 mg daily  - No RDV or plasma    # Hyponatremia  - Na 131 on admission  - c/w NS @ 75 cc/hr  - monitor    # DM  - on metformin at home  - started lantus 12 and SS on 1/8  - monitor FS  - f/u A1C    # HLD   - c/w atorvastatin 20 mg daily     # Hypothyroidism   - c/w synthroid 50 mcg daily     # Misc:  DVT ppx: Lovenox  GI ppx: protonix  Diet: Regular carb consistent  Activity: IAT  Code status: Full  Dispo: Acute

## 2021-01-08 NOTE — DISCHARGE NOTE PROVIDER - NSDCMRMEDTOKEN_GEN_ALL_CORE_FT
levothyroxine 50 mcg (0.05 mg) oral tablet: 1 tab(s) orally once a day  metFORMIN 500 mg oral tablet:   rosuvastatin 5 mg oral capsule: 1 cap(s) orally once a day   Aspirin Enteric Coated 81 mg oral delayed release tablet: 1 tab(s) orally once a day   levothyroxine 50 mcg (0.05 mg) oral tablet: 1 tab(s) orally once a day  metFORMIN 500 mg oral tablet:   predniSONE 20 mg oral tablet: 2 tab(s) orally once a day   rosuvastatin 5 mg oral capsule: 1 cap(s) orally once a day

## 2021-01-08 NOTE — PROGRESS NOTE ADULT - ASSESSMENT
46 yo M PMHx DM II on metformin, HLD, hypothyroidism presented with SOB (checked spO2 at home 90%) and generalized weakness of 1 week duration, pt had tested COVID positive on 1/1 outpatient (symptoms started 12/30).  In ED, spO2 95 on RA but desated to high 80s with minimal exertion    # Acute hypoxic respiratory failure 2/2 COVID PNA  - 1/8 spO2 98% on 3L NC,  Tmax 100.1    currently on 94% on RA ,  resting.   - CXR 1/7: bilateral opacities  - D-dimer 122, ferritin 641, CRP 7.48, procal 0.08  - c/w decadron 6 mg IV daily for 10 days (start date 1/7)  - c/w lovenox 40 mg daily  - No RDV or plasma    # Hyponatremia  - Na 131 on admission >> 137 today  - will d/c  NS @ 75 cc/hr  - monitor    # DM  - on metformin at home  - started lantus 12 and SS on 1/8  - monitor FS  - f/u A1C    # HLD   - c/w atorvastatin 20 mg daily     # Hypothyroidism   - c/w synthroid 50 mcg daily     # Misc:  DVT ppx: Lovenox  GI ppx: protonix  Diet: Regular carb consistent  Activity: IAT  Code status: Full  Dispo: Acute    #Progress Note Handoff  Pending (specify):  Clinical improvement / stability  Disposition: Home

## 2021-01-08 NOTE — DISCHARGE NOTE PROVIDER - NSDCCPCAREPLAN_GEN_ALL_CORE_FT
PRINCIPAL DISCHARGE DIAGNOSIS  Diagnosis: Pneumonia due to COVID-19 virus  Assessment and Plan of Treatment: You came in with worsening shortness of breath due to COVID pneumonia. You were treated with IV steroids, you will need to take steroids pills at home. You required oxygen during your stay. Please take your medications as prescribed and follow up with your primary care doctor in 2 weeks after discharge      SECONDARY DISCHARGE DIAGNOSES  Diagnosis: Diabetes  Assessment and Plan of Treatment: You are known to have diabetes. Your A1C is 6.6%. Please take your medications as prescribed and follow up with your primary care doctor.

## 2021-01-08 NOTE — DISCHARGE NOTE PROVIDER - CARE PROVIDER_API CALL
Jolene Quinonez)  Internal Medicine  [address] [place] [address]  Phone: [phone]  Fax: [phone]  Follow Up Time: 2 weeks

## 2021-01-08 NOTE — DISCHARGE NOTE PROVIDER - NSDCFUADDINST_GEN_ALL_CORE_FT
You have tested POSITIVE for the novel coronavirus (COVID-19).   - Please wear a face mask if you are around other individuals. Try to avoid contact with house members, family, and friends for the duration of this quarantine.   - Please follow up with your primary care physician within 2-3 weeks of your discharge from the hospital.   - Please take all medications as prescribed. If you experience any worsening or recurrence of your symptoms, particularly worsening or high fever, shortness of breathe, extreme fatigue, or bloody cough please call [phone] immediately or report to the nearest Emergency Department.

## 2021-01-08 NOTE — DISCHARGE NOTE PROVIDER - HOSPITAL COURSE
46 yo M PMHx DM II on metformin, HLD, hypothyroidism presented with SOB (checked spO2 at home 90%) and generalized weakness of 1 week duration, pt had tested COVID positive on 1/1 outpatient (symptoms started 12/30).  In ED, spO2 95 on RA but desated to high 80s with minimal exertion. Admitted for acute hypoxic respiratory failure 2/2 COVID PNA, treated with IV decadron. No RDV or plasma.

## 2021-01-08 NOTE — PROGRESS NOTE ADULT - SUBJECTIVE AND OBJECTIVE BOX
SUBJECTIVE:    Patient is a 45y old Male who presents with a chief complaint of SOB (07 Jan 2021 08:44)    Currently admitted to medicine with the primary diagnosis of Pneumonia due to COVID-19 virus       Today is hospital day 1d. This morning he is resting comfortably in bed and reports no new issues or overnight events.     PAST MEDICAL & SURGICAL HISTORY  Hypercholesterolemia    Hypothyroid    Diabetes    No significant past surgical history      SOCIAL HISTORY:  Negative for smoking/alcohol/drug use.     ALLERGIES:  No Known Drug Allergies  seasonal (Other)    MEDICATIONS:  STANDING MEDICATIONS  atorvastatin 20 milliGRAM(s) Oral at bedtime  chlorhexidine 4% Liquid 1 Application(s) Topical <User Schedule>  dexAMETHasone  Injectable 6 milliGRAM(s) IV Push daily  enoxaparin Injectable 40 milliGRAM(s) SubCutaneous daily  influenza   Vaccine 0.5 milliLiter(s) IntraMuscular once  levothyroxine 50 MICROGram(s) Oral daily  metFORMIN 500 milliGRAM(s) Oral two times a day  pantoprazole    Tablet 40 milliGRAM(s) Oral before breakfast  sodium chloride 0.9%. 1000 milliLiter(s) IV Continuous <Continuous>    PRN MEDICATIONS  acetaminophen   Tablet .. 650 milliGRAM(s) Oral every 6 hours PRN    VITALS:   T(F): 98  HR: 71  BP: 130/75  RR: 18  SpO2: 100%    LABS:                        12.6   4.68  )-----------( 212      ( 07 Jan 2021 07:00 )             36.8     01-07    131<L>  |  96<L>  |  12  ----------------------------<  118<H>  4.2   |  24  |  0.9    Ca    8.7      07 Jan 2021 07:00    TPro  6.8  /  Alb  3.7  /  TBili  0.5  /  DBili  x   /  AST  33  /  ALT  28  /  AlkPhos  49  01-07                  RADIOLOGY:    PHYSICAL EXAM:  GEN: No acute distress  LUNGS: Clear to auscultation bilaterally   HEART: Regular  ABD: Soft, non-tender, non-distended.  EXT: NC/NC/NE/2+PP/GUZMAN/Skin Intact.   NEURO: AAOX3    Intravenous access:   NG tube:   Ny Catheter:        SUBJECTIVE:    Patient is a 45y old Male who presents with a chief complaint of SOB (07 Jan 2021 08:44)    Currently admitted to medicine with the primary diagnosis of Pneumonia due to COVID-19 virus       Today is hospital day 1d. This morning he is resting comfortably in bed and reports no new issues or overnight events.     PAST MEDICAL & SURGICAL HISTORY  Hypercholesterolemia    Hypothyroid    Diabetes    No significant past surgical history      SOCIAL HISTORY:  Negative for smoking/alcohol/drug use.     ALLERGIES:  No Known Drug Allergies  seasonal (Other)    MEDICATIONS:  STANDING MEDICATIONS  atorvastatin 20 milliGRAM(s) Oral at bedtime  chlorhexidine 4% Liquid 1 Application(s) Topical <User Schedule>  dexAMETHasone  Injectable 6 milliGRAM(s) IV Push daily  enoxaparin Injectable 40 milliGRAM(s) SubCutaneous daily  influenza   Vaccine 0.5 milliLiter(s) IntraMuscular once  levothyroxine 50 MICROGram(s) Oral daily  metFORMIN 500 milliGRAM(s) Oral two times a day  pantoprazole    Tablet 40 milliGRAM(s) Oral before breakfast  sodium chloride 0.9%. 1000 milliLiter(s) IV Continuous <Continuous>    PRN MEDICATIONS  acetaminophen   Tablet .. 650 milliGRAM(s) Oral every 6 hours PRN    VITALS:   T(F): 98  HR: 71  BP: 130/75  RR: 18  SpO2: 100%    LABS:                        12.6   4.68  )-----------( 212      ( 07 Jan 2021 07:00 )             36.8     01-07    131<L>  |  96<L>  |  12  ----------------------------<  118<H>  4.2   |  24  |  0.9    Ca    8.7      07 Jan 2021 07:00    TPro  6.8  /  Alb  3.7  /  TBili  0.5  /  DBili  x   /  AST  33  /  ALT  28  /  AlkPhos  49  01-07                  RADIOLOGY:  Xray Chest 1/7:  Bilateral opacities. No pleural effusion or air leak      PHYSICAL EXAM:  GEN: No acute distress  LUNGS: Clear to auscultation bilaterally   HEART: Regular  ABD: Soft, non-tender, non-distended.  EXT: No edema   NEURO: AAOX3

## 2021-01-08 NOTE — CHART NOTE - NSCHARTNOTEFT_GEN_A_CORE
I made rounds today with the treatment team including the hospitalist, residents,  nurses and  and discussed the patient's current medical status and discharge  planning needs, and reviewed the chart.    T(C): 36.7 (01-08-21 @ 05:00), Max: 36.9 (01-07-21 @ 12:45)  HR: 71 (01-08-21 @ 05:00) (71 - 88)  BP: 130/75 (01-08-21 @ 05:00) (115/56 - 130/75)  RR: 18 (01-08-21 @ 05:00) (18 - 18)  SpO2: 100% (01-08-21 @ 07:53) (97% - 100%)          I reached out to the patient's health care proxy/ responsible family member-           [     ]  I reached                                     and discussed the patient's medical condition,                   family concerns, and discharge planning           [     ]  I left a message with family               [  x   ]  I personally participated in rounds with the medical team and my resident and discussed the case. My resident reached                   family member/ HCP   Gareth ( brother ) [phone]    under my direction and supervision  and we reviewed the conversation          [     ]  My resident left a message with family under my direction and supervision                [     ]   My resident attended rounds and called the family     The following was discussed: updated medical status           [     ] I spent 5-10 minutes on the above discussing medical issues with team members and family and/ or my resident    [  x   ] I spent 11-20 minutes on the above discussing medical issues with team members and family and/ or my resident    [     ] I spent 21-30 minutes on the above discussing medical issues with team members and family and/ or my resident

## 2021-01-09 ENCOUNTER — TRANSCRIPTION ENCOUNTER (OUTPATIENT)
Age: 46
End: 2021-01-09

## 2021-01-09 VITALS — OXYGEN SATURATION: 93 %

## 2021-01-09 LAB
GLUCOSE BLDC GLUCOMTR-MCNC: 143 MG/DL — HIGH (ref 70–99)
GLUCOSE BLDC GLUCOMTR-MCNC: 162 MG/DL — HIGH (ref 70–99)

## 2021-01-09 RX ORDER — GUAIFENESIN/DEXTROMETHORPHAN 600MG-30MG
10 TABLET, EXTENDED RELEASE 12 HR ORAL EVERY 8 HOURS
Refills: 0 | Status: DISCONTINUED | OUTPATIENT
Start: 2021-01-09 | End: 2021-01-09

## 2021-01-09 RX ORDER — ASPIRIN/CALCIUM CARB/MAGNESIUM 324 MG
1 TABLET ORAL
Qty: 30 | Refills: 0
Start: 2021-01-09 | End: 2021-02-07

## 2021-01-09 RX ADMIN — CHLORHEXIDINE GLUCONATE 1 APPLICATION(S): 213 SOLUTION TOPICAL at 06:18

## 2021-01-09 RX ADMIN — Medication 50 MICROGRAM(S): at 05:48

## 2021-01-09 RX ADMIN — Medication 1: at 12:09

## 2021-01-09 RX ADMIN — ENOXAPARIN SODIUM 40 MILLIGRAM(S): 100 INJECTION SUBCUTANEOUS at 12:10

## 2021-01-09 RX ADMIN — PANTOPRAZOLE SODIUM 40 MILLIGRAM(S): 20 TABLET, DELAYED RELEASE ORAL at 05:48

## 2021-01-09 RX ADMIN — Medication 6 MILLIGRAM(S): at 05:49

## 2021-01-09 NOTE — PROGRESS NOTE ADULT - SUBJECTIVE AND OBJECTIVE BOX
S- Patient seen and examined at [facility]. Patient complains feeling short of breathe. States he feels better after nasal canula 2L placed. No other complaints at this time.     O-  Vitals:  T- 98.8F  HR- 80 bpm  BP- 128/77 mmHg  R- 88% RA. 91-92% 2L NC    Physical Exam:  Constitutional- Patient laying in bed in no visible distress.   Cardiac- S1 S2 RRR  Pulmonary- Clear to auscultation bilateral  Abdomen- Soft nontender nondistended  MSK- Full ROM of all 4 ext

## 2021-01-09 NOTE — PROGRESS NOTE ADULT - ASSESSMENT
A- Patient is 45 year old male seen at [facility]. Patient O2 saturation was 88% on room air. Placed patient on 2L NC and oxygen saturation went up to 91%. Patient remaining vitals and physical exam are normal.     P- Patients inpatient plan is as follows  # Acute hypoxic respiratory failure 2/2 COVID PNA  - c/w decadron 6 mg IV daily for 10 days (start date 1/7)  - c/w lovenox 40 mg daily  - No RDV or plasma    # Hyponatremia  - resolved. continue to monitor    # DM  - on metformin at home  - lantus 12 and SS   - monitor FS  - f/u A1C    # HLD   - c/w atorvastatin 20 mg daily     # Hypothyroidism   - c/w synthroid 50 mcg daily     # Misc:  DVT ppx: Lovenox  GI ppx: protonix  Diet: Regular carb consistent  Activity: IAT  Code status: Full  Dispo: Acute

## 2021-01-09 NOTE — DISCHARGE NOTE NURSING/CASE MANAGEMENT/SOCIAL WORK - PATIENT PORTAL LINK FT
You can access the FollowMyHealth Patient Portal offered by [facility] by registering at the following website: [email or web address]. By joining [facility]’s FollowMyHealth portal, you will also be able to view your health information using other applications (apps) compatible with our system.

## 2021-01-09 NOTE — PROGRESS NOTE ADULT - ATTENDING COMMENTS
Patient seen and evaluated at bedside. Spoke with patient in Cantonese. Patient was able to ambulate and tolerate room air with SaO2 93%. Patient did not require supplemental oxygen after being seen at 9:30AM this morning. Patient reports no chest pains or chest tightness. Able to ambulate without assistance although walks slowly. Counseled on fall precautions. Patient feeling ready to go home today and wishes to go home today. Should continue steroids to complete 10-day course of treatment. Aspirin for extended DVT ppx. Patient is clinically and hemodynamically stable for discharge. Return precautions discussed and given. Patient verbalized understanding.

## 2021-01-10 ENCOUNTER — TRANSCRIPTION ENCOUNTER (OUTPATIENT)
Age: 46
End: 2021-01-10

## 2021-01-13 DIAGNOSIS — J12.82 PNEUMONIA DUE TO CORONAVIRUS DISEASE 2019: ICD-10-CM

## 2021-01-13 DIAGNOSIS — J96.01 ACUTE RESPIRATORY FAILURE WITH HYPOXIA: ICD-10-CM

## 2021-01-13 DIAGNOSIS — E03.9 HYPOTHYROIDISM, UNSPECIFIED: ICD-10-CM

## 2021-01-13 DIAGNOSIS — U07.1 COVID-19: ICD-10-CM

## 2021-01-13 DIAGNOSIS — E11.9 TYPE 2 DIABETES MELLITUS WITHOUT COMPLICATIONS: ICD-10-CM

## 2021-01-13 DIAGNOSIS — E87.1 HYPO-OSMOLALITY AND HYPONATREMIA: ICD-10-CM

## 2021-01-13 DIAGNOSIS — Z79.84 LONG TERM (CURRENT) USE OF ORAL HYPOGLYCEMIC DRUGS: ICD-10-CM

## 2021-01-13 DIAGNOSIS — E78.5 HYPERLIPIDEMIA, UNSPECIFIED: ICD-10-CM

## 2021-01-27 PROBLEM — E11.9 TYPE 2 DIABETES MELLITUS WITHOUT COMPLICATIONS: Chronic | Status: ACTIVE | Noted: 2021-01-07

## 2021-01-27 PROBLEM — E03.9 HYPOTHYROIDISM, UNSPECIFIED: Chronic | Status: ACTIVE | Noted: 2021-01-07

## 2021-01-27 PROBLEM — E78.00 PURE HYPERCHOLESTEROLEMIA, UNSPECIFIED: Chronic | Status: ACTIVE | Noted: 2021-01-07

## 2021-01-28 ENCOUNTER — APPOINTMENT (OUTPATIENT)
Dept: INTERNAL MEDICINE | Facility: CLINIC | Age: 46
End: 2021-01-28

## 2021-01-28 PROBLEM — Z00.00 ENCOUNTER FOR PREVENTIVE HEALTH EXAMINATION: Status: ACTIVE | Noted: 2021-01-28
